# Patient Record
Sex: MALE | Race: WHITE | Employment: STUDENT | URBAN - METROPOLITAN AREA
[De-identification: names, ages, dates, MRNs, and addresses within clinical notes are randomized per-mention and may not be internally consistent; named-entity substitution may affect disease eponyms.]

---
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
## 2024-01-22 ENCOUNTER — HOSPITAL ENCOUNTER (INPATIENT)
Age: 19
LOS: 4 days | Discharge: HOME | End: 2024-01-26
Attending: EMERGENCY MEDICINE | Admitting: PSYCHIATRY & NEUROLOGY
Payer: COMMERCIAL

## 2024-01-22 DIAGNOSIS — R45.851 SUICIDAL IDEATION: Primary | ICD-10-CM

## 2024-01-22 PROBLEM — F32.A DEPRESSION WITH SUICIDAL IDEATION: Status: ACTIVE | Noted: 2024-01-22

## 2024-01-22 PROBLEM — F33.2 MAJOR DEPRESSIVE DISORDER, RECURRENT SEVERE WITHOUT PSYCHOTIC FEATURES (HCC): Status: ACTIVE | Noted: 2024-01-22

## 2024-01-22 LAB
ALBUMIN SERPL-MCNC: 4.7 G/DL (ref 3.5–5.2)
ALP SERPL-CCNC: 92 U/L (ref 40–129)
ALT SERPL-CCNC: 20 U/L (ref 5–41)
AMPHET UR QL SCN: NEGATIVE
ANION GAP SERPL CALCULATED.3IONS-SCNC: 9 MMOL/L (ref 9–17)
AST SERPL-CCNC: 24 U/L
BARBITURATES UR QL SCN: NEGATIVE
BASOPHILS # BLD: 0 K/UL (ref 0–0.2)
BASOPHILS NFR BLD: 0 % (ref 0–2)
BENZODIAZ UR QL: NEGATIVE
BILIRUB DIRECT SERPL-MCNC: <0.1 MG/DL
BILIRUB INDIRECT SERPL-MCNC: NORMAL MG/DL (ref 0–1)
BILIRUB SERPL-MCNC: 0.3 MG/DL (ref 0.3–1.2)
BILIRUB UR QL STRIP: NEGATIVE
BUN SERPL-MCNC: 11 MG/DL (ref 6–20)
CALCIUM SERPL-MCNC: 9.8 MG/DL (ref 8.6–10.4)
CANNABINOIDS UR QL SCN: POSITIVE
CHLORIDE SERPL-SCNC: 105 MMOL/L (ref 98–107)
CLARITY UR: CLEAR
CO2 SERPL-SCNC: 25 MMOL/L (ref 20–31)
COCAINE UR QL SCN: NEGATIVE
COLOR UR: YELLOW
COMMENT: NORMAL
CREAT SERPL-MCNC: 1 MG/DL (ref 0.7–1.2)
EOSINOPHIL # BLD: 0 K/UL (ref 0–0.4)
EOSINOPHILS RELATIVE PERCENT: 0 % (ref 0–4)
ERYTHROCYTE [DISTWIDTH] IN BLOOD BY AUTOMATED COUNT: 13.3 % (ref 11.5–14.9)
ETHANOL PERCENT: <0.01 %
ETHANOLAMINE SERPL-MCNC: <10 MG/DL
FENTANYL UR QL: NEGATIVE
GFR SERPL CREATININE-BSD FRML MDRD: >60 ML/MIN/1.73M2
GLUCOSE SERPL-MCNC: 122 MG/DL (ref 70–99)
GLUCOSE UR STRIP-MCNC: NEGATIVE MG/DL
HCT VFR BLD AUTO: 46.9 % (ref 41–53)
HGB BLD-MCNC: 15.7 G/DL (ref 13.5–17.5)
HGB UR QL STRIP.AUTO: NEGATIVE
KETONES UR STRIP-MCNC: NEGATIVE MG/DL
LEUKOCYTE ESTERASE UR QL STRIP: NEGATIVE
LYMPHOCYTES NFR BLD: 1.2 K/UL (ref 1.2–5.2)
LYMPHOCYTES RELATIVE PERCENT: 11 % (ref 25–45)
MCH RBC QN AUTO: 30 PG (ref 26–34)
MCHC RBC AUTO-ENTMCNC: 33.4 G/DL (ref 31–37)
MCV RBC AUTO: 89.7 FL (ref 80–100)
METHADONE UR QL: NEGATIVE
MONOCYTES NFR BLD: 0.4 K/UL (ref 0.1–1.3)
MONOCYTES NFR BLD: 4 % (ref 2–8)
NEUTROPHILS NFR BLD: 85 % (ref 34–64)
NEUTS SEG NFR BLD: 9.2 K/UL (ref 1.3–9.1)
NITRITE UR QL STRIP: NEGATIVE
OPIATES UR QL SCN: NEGATIVE
OXYCODONE UR QL SCN: NEGATIVE
PCP UR QL SCN: NEGATIVE
PH UR STRIP: 6.5 [PH] (ref 5–8)
PLATELET # BLD AUTO: 258 K/UL (ref 150–450)
PMV BLD AUTO: 8.4 FL (ref 6–12)
POTASSIUM SERPL-SCNC: 4.3 MMOL/L (ref 3.7–5.3)
PROT SERPL-MCNC: 7.4 G/DL (ref 6.4–8.3)
PROT UR STRIP-MCNC: NEGATIVE MG/DL
RBC # BLD AUTO: 5.23 M/UL (ref 4.5–5.9)
SODIUM SERPL-SCNC: 139 MMOL/L (ref 135–144)
SP GR UR STRIP: 1.01 (ref 1–1.03)
TEST INFORMATION: ABNORMAL
TSH SERPL DL<=0.05 MIU/L-ACNC: 0.51 UIU/ML (ref 0.3–5)
UROBILINOGEN UR STRIP-ACNC: NORMAL EU/DL (ref 0–1)
WBC OTHER # BLD: 10.9 K/UL (ref 4.5–13.5)

## 2024-01-22 PROCEDURE — 36415 COLL VENOUS BLD VENIPUNCTURE: CPT

## 2024-01-22 PROCEDURE — 99223 1ST HOSP IP/OBS HIGH 75: CPT | Performed by: PSYCHIATRY & NEUROLOGY

## 2024-01-22 PROCEDURE — APPSS60 APP SPLIT SHARED TIME 46-60 MINUTES: Performed by: NURSE PRACTITIONER

## 2024-01-22 PROCEDURE — 80076 HEPATIC FUNCTION PANEL: CPT

## 2024-01-22 PROCEDURE — 85025 COMPLETE CBC W/AUTO DIFF WBC: CPT

## 2024-01-22 PROCEDURE — 80048 BASIC METABOLIC PNL TOTAL CA: CPT

## 2024-01-22 PROCEDURE — 80307 DRUG TEST PRSMV CHEM ANLYZR: CPT

## 2024-01-22 PROCEDURE — 81003 URINALYSIS AUTO W/O SCOPE: CPT

## 2024-01-22 PROCEDURE — 1240000000 HC EMOTIONAL WELLNESS R&B

## 2024-01-22 PROCEDURE — 99222 1ST HOSP IP/OBS MODERATE 55: CPT | Performed by: INTERNAL MEDICINE

## 2024-01-22 PROCEDURE — G0480 DRUG TEST DEF 1-7 CLASSES: HCPCS

## 2024-01-22 PROCEDURE — 84443 ASSAY THYROID STIM HORMONE: CPT

## 2024-01-22 PROCEDURE — 99285 EMERGENCY DEPT VISIT HI MDM: CPT

## 2024-01-22 RX ORDER — ACETAMINOPHEN 325 MG/1
650 TABLET ORAL EVERY 4 HOURS PRN
Status: DISCONTINUED | OUTPATIENT
Start: 2024-01-22 | End: 2024-01-26 | Stop reason: HOSPADM

## 2024-01-22 RX ORDER — CITALOPRAM HYDROBROMIDE 10 MG/1
10 TABLET ORAL DAILY
Status: DISCONTINUED | OUTPATIENT
Start: 2024-01-22 | End: 2024-01-23

## 2024-01-22 RX ORDER — IBUPROFEN 400 MG/1
400 TABLET ORAL EVERY 6 HOURS PRN
Status: DISCONTINUED | OUTPATIENT
Start: 2024-01-22 | End: 2024-01-26 | Stop reason: HOSPADM

## 2024-01-22 RX ORDER — MAGNESIUM HYDROXIDE/ALUMINUM HYDROXICE/SIMETHICONE 120; 1200; 1200 MG/30ML; MG/30ML; MG/30ML
30 SUSPENSION ORAL EVERY 6 HOURS PRN
Status: DISCONTINUED | OUTPATIENT
Start: 2024-01-22 | End: 2024-01-26 | Stop reason: HOSPADM

## 2024-01-22 RX ORDER — POLYETHYLENE GLYCOL 3350 17 G/17G
17 POWDER, FOR SOLUTION ORAL DAILY PRN
Status: DISCONTINUED | OUTPATIENT
Start: 2024-01-22 | End: 2024-01-26 | Stop reason: HOSPADM

## 2024-01-22 RX ORDER — HYDROXYZINE 50 MG/1
50 TABLET, FILM COATED ORAL 3 TIMES DAILY PRN
Status: DISCONTINUED | OUTPATIENT
Start: 2024-01-22 | End: 2024-01-26 | Stop reason: HOSPADM

## 2024-01-22 RX ORDER — TRAZODONE HYDROCHLORIDE 50 MG/1
50 TABLET ORAL NIGHTLY PRN
Status: DISCONTINUED | OUTPATIENT
Start: 2024-01-22 | End: 2024-01-26 | Stop reason: HOSPADM

## 2024-01-22 ASSESSMENT — PATIENT HEALTH QUESTIONNAIRE - PHQ9
1. LITTLE INTEREST OR PLEASURE IN DOING THINGS: 1
SUM OF ALL RESPONSES TO PHQ QUESTIONS 1-9: 1
SUM OF ALL RESPONSES TO PHQ9 QUESTIONS 1 & 2: 1
2. FEELING DOWN, DEPRESSED OR HOPELESS: 0
SUM OF ALL RESPONSES TO PHQ QUESTIONS 1-9: 1

## 2024-01-22 ASSESSMENT — SLEEP AND FATIGUE QUESTIONNAIRES
AVERAGE NUMBER OF SLEEP HOURS: 7
DO YOU HAVE DIFFICULTY SLEEPING: NO
DO YOU USE A SLEEP AID: NO

## 2024-01-22 ASSESSMENT — LIFESTYLE VARIABLES
HOW OFTEN DO YOU HAVE A DRINK CONTAINING ALCOHOL: MONTHLY OR LESS
HOW MANY STANDARD DRINKS CONTAINING ALCOHOL DO YOU HAVE ON A TYPICAL DAY: PATIENT DOES NOT DRINK
HOW MANY STANDARD DRINKS CONTAINING ALCOHOL DO YOU HAVE ON A TYPICAL DAY: 1 OR 2
HOW OFTEN DO YOU HAVE A DRINK CONTAINING ALCOHOL: NEVER
HOW OFTEN DO YOU HAVE A DRINK CONTAINING ALCOHOL: NEVER
HOW MANY STANDARD DRINKS CONTAINING ALCOHOL DO YOU HAVE ON A TYPICAL DAY: PATIENT DOES NOT DRINK

## 2024-01-22 ASSESSMENT — ENCOUNTER SYMPTOMS
VOMITING: 0
SHORTNESS OF BREATH: 0
ABDOMINAL PAIN: 0
NAUSEA: 0
COUGH: 0

## 2024-01-22 ASSESSMENT — PAIN - FUNCTIONAL ASSESSMENT: PAIN_FUNCTIONAL_ASSESSMENT: NONE - DENIES PAIN

## 2024-01-22 NOTE — PROGRESS NOTES
Pharmacy Medication History Note      List of current medications patient is taking is complete.    Source of information: OARRS, Care Everywhere, [facility] [address] ([place])    Changes made to medication list:  Medications removed (include reason, ex. therapy complete or physician discontinued, noncompliance):  none    Medications flagged for provider review:  none    Medications added/doses adjusted:  none    Other notes (ex. Recent course of antibiotics, Coumadin dosing):  OARRS negative  Per Shraddha, patient does not fill any medications at [facility]       Current Home Medication List at Time of Admission:  Prior to Admission medications    Medication Sig   acetaminophen (TYLENOL) 500 MG tablet Take 2 tablets by mouth every 6 hours as needed for Pain   ibuprofen (ADVIL) 200 MG tablet Take 2 tablets by mouth every 6 hours as needed for Pain         Please let me know if you have any questions about this encounter. Thank you!    Electronically signed by Bonnie Arteaga RP on 1/22/2024 at 3:37 PM

## 2024-01-22 NOTE — BH NOTE
[facility]  Admission Note     Admission Type:   Admission Type: Voluntary    Reason for admission:  Reason for Admission: Patient reports increase in depressive thoughts and thoughts of hurting self      Addictive Behavior:   Addictive Behavior  In the Past 3 Months, Have You Felt or Has Someone Told You That You Have a Problem With  : None    Medical Problems:   Past Medical History:   Diagnosis Date    ADHD (attention deficit hyperactivity disorder)     Dx at [facility]     COVID-19 vaccination not done     Depression     Lymphatic malformation     Rt chest wall    Wellness examination     Anna Resendiz CNP; last visit 1/13/23       Status EXAM:  Mental Status and Behavioral Exam  Normal: No  Level of Assistance: Independent/Self  Facial Expression: Avoids Gaze  Affect: Incongruent  Level of Consciousness: Alert  Frequency of Checks: 4 times per hour, close  Mood:Normal: No  Mood: Depressed, Anxious, Suspicious  Motor Activity:Normal: Yes  Eye Contact: Fair  Observed Behavior: Withdrawn, Guarded, Preoccupied  Sexual Misconduct History: Current - no  Preception: [place] to person, [place] to time, [place] to place, [place] to situation  Attention:Normal: Yes  Thought Processes: Circumstantial  Thought Content:Normal: No  Thought Content: Paranoia, Preoccupations  Depression Symptoms: Change in energy level, Feelings of hopelessess, Loss of interest  Anxiety Symptoms: Generalized  Inga Symptoms: No problems reported or observed.  Hallucinations: None  Delusions: No  Memory:Normal: No  Memory: Poor recent, Poor remote  Insight and Judgment: No  Insight and Judgment: Poor judgment, Poor insight, Unmotivated    Tobacco Screening:  Practical Counseling, on admission, cyndi X, if applicable and completed (first 3 are required if patient doesn't refuse):            ( ) Recognizing danger situations (included triggers and roadblocks)                    ( ) Coping skills (new ways to manage stress,relaxation
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
## 2024-01-22 NOTE — BH NOTE
Patient given tobacco quitline number 72261203808 at this time, refusing to call at this time, states \" I just dont want to quit now\"- patient given information as to the dangers of long term tobacco use. Continue to reinforce the importance of tobacco cessation.

## 2024-01-22 NOTE — PROGRESS NOTES

## 2024-01-22 NOTE — GROUP NOTE
Group Therapy Note    Date: 1/22/2024    Group Start Time: 1330  Group End Time: 1445  Group Topic: recreation therapy group     [facility]        Group Therapy Note    Attendees: 14/19       Patient's Goal:  pt will demonstrate improved improved leisure awareness and improved interpersonal relationships     Notes:  pt was pleasant and participated well    Status After Intervention:  Improved    Participation Level: Active Listener and Interactive    Participation Quality: Appropriate and Supportive      Speech:  normal      Thought Process/Content: Logical      Affective Functioning:flat      Mood: depressed      Level of consciousness:  Alert      Response to Learning: Able to verbalize current knowledge/experience, Capable of insight, and Progressing to goal      Endings: None Reported    Modes of Intervention: Education, Support, and Activity      Discipline Responsible: Psychoeducational Specialist      Signature:  MIGUEL ÁNGEL BUSH

## 2024-01-22 NOTE — ED NOTES
Patient gave verbal consent to update mom. Writer called mom and gave admitting information to her.
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
## 2024-01-22 NOTE — H&P
IN-PATIENT SERVICE  [facility] Internal Medicine    HISTORY AND PHYSICAL EXAMINATION/ CONSULT NOTE            Date:   1/22/2024  Patient name:  Sheba Arciniega  Date of admission:  1/22/2024  8:43 AM  MRN:   [id number]  Account:  [id number]  YOB: 2005  PCP:    Anna Resendiz APRN - CNP  Room:   [address] [place] [address]  Code Status:    Full Code    Physician Requesting Consult: Tino Hamm MD    Reason for Consult: History and physical, medical management    Chief Complaint:     Chief Complaint   Patient presents with    Suicidal     Medical management    History Obtained From:     Patient, EMR, nursing staff    History of Present Illness:     18-year-old male admitted for suicidal ideation    No significant medical history  Patient denies any chest pain palpitation cough difficulty breathing nausea vomiting diarrhea or urinary symptoms      Past Medical History:     Past Medical History:   Diagnosis Date    ADHD (attention deficit hyperactivity disorder)     Dx at [facility]     COVID-19 vaccination not done     Depression     Lymphatic malformation     Rt chest wall    Wellness examination     Anna Resendiz CNP; last visit 1/13/23        Past Surgical History:     Past Surgical History:   Procedure Laterality Date    BREAST SURGERY Right 4/5/2023    EXCISION CHEST WALL MALFORMATION performed by Praveen Banerjee MD at [facility] OR    SKIN LESION EXCISION Right 04/05/2023    EXCISION CHEST WALL MALFORMATION (Right)        Medications Prior to Admission:     Prior to Admission medications    Medication Sig Start Date End Date Taking? Authorizing Provider   acetaminophen (TYLENOL) 500 MG tablet Take 2 tablets by mouth every 6 hours as needed for Pain 4/5/23   Lexa Eaton APRN - CNP   ibuprofen (ADVIL) 200 MG tablet Take 2 tablets by mouth every 6 hours as needed for Pain 4/5/23   Lexa Eaton APRN - CNP        Allergies:     Patient has no known allergies.    Social History:     Tobacco:     reports that he has never smoked. He has never used smokeless tobacco.  Alcohol:      reports no history of alcohol use.  Drug Use:  reports no history of drug use.    Family History:     Family History   Problem Relation Age of Onset    Depression Mother     Asthma Mother     Other Mother         PCOS    No Known Problems Father     Depression Maternal Grandfather     Diabetes Paternal Grandmother     Heart Disease Paternal Grandmother     High Blood Pressure Paternal Grandfather     Diabetes Paternal Grandfather        Review of Systems:     Positive and Negative as described in HPI.    Denies any shortness of breath or cough  Denies chest pain or palpitations  Denies abdominal pain, diarrhea vomiting  Denies any new numbness tremors or weakness.    10 point review of systems was negative other than mentioned above    Physical Exam:     /72   Pulse 72   Temp 98.3 °F (36.8 °C) (Oral)   Resp 15   Ht 1.829 m (6')   Wt 68 kg (150 lb)   SpO2 99%   BMI 20.34 kg/m²   Temp (24hrs), Av.2 °F (36.8 °C), Min:98 °F (36.7 °C), Max:98.3 °F (36.8 °C)    No results for input(s): \"POCGLU\" in the last 72 hours.  No intake or output data in the 24 hours ending 24 1612    General Appearance:  alert, well appearing, and in no acute distress  Head:  normocephalic, atraumatic.  Eye: no icterus, redness, pupils equal and reactive, extraocular eye movements intact, conjunctiva clear  Ear: normal external ear, no discharge, hearing intact  Nose:  no drainage noted  Mouth: mucous membranes moist  Neck: supple, no carotid bruits, thyroid not palpable  Lungs: Bilateral equal air entry, clear to ausculation, no wheezing, rales or rhonchi, normal effort  Cardiovascular: normal rate, regular rhythm, no murmur, gallop, rub.  Abdomen: Soft, nontender, nondistended, normal bowel sounds, no hepatomegaly or splenomegaly  Neurologic: There are no new focal motor or sensory deficits, normal muscle tone and bulk, no abnormal
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
## 2024-01-23 PROCEDURE — 1240000000 HC EMOTIONAL WELLNESS R&B

## 2024-01-23 PROCEDURE — 6370000000 HC RX 637 (ALT 250 FOR IP): Performed by: NURSE PRACTITIONER

## 2024-01-23 PROCEDURE — 99232 SBSQ HOSP IP/OBS MODERATE 35: CPT | Performed by: PSYCHIATRY & NEUROLOGY

## 2024-01-23 PROCEDURE — 6370000000 HC RX 637 (ALT 250 FOR IP): Performed by: PSYCHIATRY & NEUROLOGY

## 2024-01-23 PROCEDURE — APPSS30 APP SPLIT SHARED TIME 16-30 MINUTES: Performed by: NURSE PRACTITIONER

## 2024-01-23 RX ORDER — CITALOPRAM 20 MG/1
20 TABLET ORAL DAILY
Status: DISCONTINUED | OUTPATIENT
Start: 2024-01-24 | End: 2024-01-26 | Stop reason: HOSPADM

## 2024-01-23 RX ADMIN — CITALOPRAM HYDROBROMIDE 10 MG: 10 TABLET ORAL at 08:09

## 2024-01-23 RX ADMIN — ACETAMINOPHEN 650 MG: 325 TABLET ORAL at 08:09

## 2024-01-23 RX ADMIN — IBUPROFEN 400 MG: 400 TABLET, FILM COATED ORAL at 12:11

## 2024-01-23 ASSESSMENT — PAIN SCALES - GENERAL
PAINLEVEL_OUTOF10: 5
PAINLEVEL_OUTOF10: 6
PAINLEVEL_OUTOF10: 7

## 2024-01-23 ASSESSMENT — PAIN DESCRIPTION - DESCRIPTORS: DESCRIPTORS: ACHING

## 2024-01-23 ASSESSMENT — PAIN DESCRIPTION - LOCATION: LOCATION: HEAD

## 2024-01-23 NOTE — PLAN OF CARE
[facility]  Initial Interdisciplinary Treatment Plan Note      Original treatment plan Date & Time: 1/23/2024   1245    Admission Type:  Admission Type: Voluntary    Reason for admission:   Reason for Admission: Patient reports increase in depressive thoughts and thoughts of hurting self    Estimated Length of Stay:  5-7days  Estimated Discharge Date: To be determined by physician.    PATIENT STRENGTHS:  Patient Strengths:   Patient Strengths and Limitations:Limitations: Multiple barriers to leisure interests, Difficulty problem solving/relies on others to help solve problems, External locus of control, Inappropriate/potentially harmful leisure interests  Addictive Behavior: Addictive Behavior  In the Past 3 Months, Have You Felt or Has Someone Told You That You Have a Problem With  : None  Medical Problems:  Past Medical History:   Diagnosis Date    ADHD (attention deficit hyperactivity disorder)     Dx at [facility]     COVID-19 vaccination not done     Depression     Lymphatic malformation     Rt chest wall    Wellness examination     Anna Resendiz CNP; last visit 1/13/23     Status EXAM:Mental Status and Behavioral Exam  Normal: No  Level of Assistance: Independent/Self  Facial Expression: Avoids Gaze  Affect: Incongruent  Level of Consciousness: Alert  Frequency of Checks: 4 times per hour, close  Mood:Normal: No  Mood: Depressed, Anxious  Motor Activity:Normal: Yes  Eye Contact: Fair  Observed Behavior: Withdrawn, Guarded  Sexual Misconduct History: Current - no  Preception: [place] to person, [place] to time, [place] to place, [place] to situation  Attention:Normal: Yes  Attention: Distractible  Thought Processes: Circumstantial  Thought Content:Normal: No  Thought Content: Paranoia, Preoccupations  Depression Symptoms: Change in energy level, Loss of interest, Feelings of hopelessess  Anxiety Symptoms: Generalized  Inga Symptoms: No problems reported or observed.  Hallucinations: None  Delusions:

## 2024-01-23 NOTE — GROUP NOTE
Group Therapy Note    Date: 1/23/2024    Group Start Time: 1100  Group End Time: 1140  Group Topic: Cognitive Skills    [facility]        Group Therapy Note    Attendees: 13/20       Patient's Goal:  pt will demonstrate improved coping skills and improved decision making skills     Notes:  pt was pleasant and participated well    Status After Intervention:  Improved    Participation Level: Active Listener and Interactive    Participation Quality: Appropriate and Supportive      Speech:  normal      Thought Process/Content: Logical      Affective Functioning: Congruent      Mood: euthymic      Level of consciousness:  Alert      Response to Learning: Able to verbalize current knowledge/experience, Capable of insight, and Progressing to goal      Endings: None Reported    Modes of Intervention: Support and Activity      Discipline Responsible: Psychoeducational Specialist      Signature:  MIGUEL ÁNGEL BUSH
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
## 2024-01-23 NOTE — PLAN OF CARE
Problem: Self Harm/Suicidality  Goal: Will have no self-injury during hospital stay  Description: INTERVENTIONS:  1.  Ensure constant observer at bedside with Q15M safety checks  2.  Maintain a safe environment  3.  Secure patient belongings  4.  Ensure family/visitors adhere to safety recommendations  5.  Ensure safety tray has been added to patient's diet order  6.  Every shift and PRN: Re-assess suicidal risk via Frequent Screener    Outcome: Progressing  Note: No self harm behaviors noted. Patient denies suicidal and homicidal ideation and auditory and visual hallucinations and verbally agrees to approach staff if thoughts of self harm arises. Patient admits to depression and anxiety. Patient is eating and sleeping well. Patient is out and social. Patient attends unit programming. Reassurance and support provided. Q15 minute checks maintained. Patient remains safe at this time.

## 2024-01-23 NOTE — PROGRESS NOTES
Behavioral Services  Medicare Certification Upon Admission    I certify that this patient's inpatient psychiatric hospital admission is medically necessary for:    [x] (1) Treatment which could reasonably be expected to improve this patient's condition,       [x] (2) Or for diagnostic study;     AND     [x](2) The inpatient psychiatric services are provided while the individual is under the care of a physician and are included in the individualized plan of care.    Estimated length of stay/service 2-9 days    Plan for post-hospital care -outpatient care    Electronically signed by MADY BOWMAN MD on 1/22/2024 at 7:56 PM

## 2024-01-23 NOTE — PLAN OF CARE
Problem: Self Harm/Suicidality  Goal: Will have no self-injury during hospital stay  Description: INTERVENTIONS:  1.  Ensure constant observer at bedside with Q15M safety checks  2.  Maintain a safe environment  3.  Secure patient belongings  4.  Ensure family/visitors adhere to safety recommendations  5.  Ensure safety tray has been added to patient's diet order  6.  Every shift and PRN: Re-assess suicidal risk via Frequent Screener    1/23/2024 1643 by Vijaya France LPN  Outcome: Progressing  Flowsheets (Taken 1/23/2024 1643)  Will have no self-injury during hospital stay:   Ensure constant observer at bedside with Q15M safety checks   Ensure family/visitors adhere to safety recommendations   Every shift and PRN: Re-assess suicidal risk via Frequent Screener   Maintain a safe environment   Secure patient belongings   Ensure safety tray has been added to patient's diet order  Note: Patient denies thoughts of self harm or harm to others during this shift. Staff encouraged patient to notify staff if thoughts of self harm or harm to others occur. Staff ensures patient safety by intermediate and safety checks every 15 minutes.       Problem: Depression  Goal: Will be euthymic at discharge  Description: INTERVENTIONS:  1. Administer medication as ordered  2. Provide emotional support via 1:1 interaction with staff  3. Encourage involvement in milieu/groups/activities  4. Monitor for social isolation  1/23/2024 1643 by Vijaya France LPN  Outcome: Progressing  Note: Patient remains free from harm to self or others but admits to continued anxiety and depression. Medications available upon request. Staff ensures safety by providing safety checks on the unit intermittently and every 15 minutes. Staff continues to provide a safe environment. Staff encouraged patient to notify if depression continues.
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
## 2024-01-23 NOTE — GROUP NOTE
Group Therapy Note    Date: 1/23/2024    Group Start Time: 0230  Group End Time: 0330  Group Topic: Cognitive Skills    Vijaya Collier LPN        Group Therapy Note    Attendees: 13/20       Patient's Goal:  Make weekly goals    Status After Intervention:  Improved    Participation Level: Active Listener and Interactive    Participation Quality: Appropriate, Attentive, Sharing, and Supportive      Speech:  normal and hesitant      Thought Process/Content: Logical  Flight of ideas      Affective Functioning: Congruent      Mood: elevated      Level of consciousness:  Alert, Oriented x4, and Attentive      Response to Learning: Able to verbalize current knowledge/experience, Able to verbalize/acknowledge new learning, Able to retain information, Capable of insight, and Able to change behavior      Endings: None Reported    Modes of Intervention: Education, Support, Socialization, Clarifying, Problem-solving, Activity, and Limit-setting      Discipline Responsible: Licensed Practical Nurse      Signature:  Vijaya France LPN

## 2024-01-23 NOTE — GROUP NOTE
Group Therapy Note    Date: 1/23/2024    Group Start Time: 0900  Group End Time: 0915  Group Topic: Community Meeting    Sindhu Monroe        Group Therapy Note    Attendees: 12/20       Patient's Goal:  Get more level     Status After Intervention:  Unchanged    Participation Level: Active Listener and Interactive    Participation Quality: Appropriate and Attentive      Speech:  normal      Thought Process/Content: Linear      Affective Functioning: Congruent      Mood: euthymic      Level of consciousness:  Alert and Oriented x4      Response to Learning: Able to verbalize current knowledge/experience and Able to verbalize/acknowledge new learning      Endings: None Reported    Modes of Intervention: Education and Support      Discipline Responsible: Behavorial Health Tech      Signature:  Sindhu Boyle

## 2024-01-23 NOTE — GROUP NOTE
Group Therapy Note    Date: 1/23/2024    Group Start Time: 1000  Group End Time: 1045  Group Topic: Group Therapy    Janine Sofia LPN        Group Therapy Note    Attendees: 13/20    Patient has attended discharge planning and safety planning group.        Signature:  Janine Dang LPN

## 2024-01-23 NOTE — BH NOTE
Provider notified of best practice advisor for suicide precautions and did not want to continue suicide precautions. Elana Herbert RN

## 2024-01-24 PROCEDURE — 1240000000 HC EMOTIONAL WELLNESS R&B

## 2024-01-24 PROCEDURE — 6370000000 HC RX 637 (ALT 250 FOR IP): Performed by: PSYCHIATRY & NEUROLOGY

## 2024-01-24 PROCEDURE — 6370000000 HC RX 637 (ALT 250 FOR IP): Performed by: NURSE PRACTITIONER

## 2024-01-24 PROCEDURE — 99232 SBSQ HOSP IP/OBS MODERATE 35: CPT | Performed by: PSYCHIATRY & NEUROLOGY

## 2024-01-24 PROCEDURE — APPSS30 APP SPLIT SHARED TIME 16-30 MINUTES

## 2024-01-24 RX ADMIN — HYDROXYZINE HYDROCHLORIDE 50 MG: 50 TABLET, FILM COATED ORAL at 12:58

## 2024-01-24 RX ADMIN — CITALOPRAM HYDROBROMIDE 20 MG: 20 TABLET ORAL at 08:18

## 2024-01-24 NOTE — PLAN OF CARE
Problem: Self Harm/Suicidality  Goal: Will have no self-injury during hospital stay  Description: INTERVENTIONS:  1.  Ensure constant observer at bedside with Q15M safety checks  2.  Maintain a safe environment  3.  Secure patient belongings  4.  Ensure family/visitors adhere to safety recommendations  5.  Ensure safety tray has been added to patient's diet order  6.  Every shift and PRN: Re-assess suicidal risk via Frequent Screener    1/24/2024 0016 by Akanksha Valdez LPN  Outcome: Progressing  Note: Patient denies wanting to hurt self of others at this time.  Patient educated to seek help from staff if needing anything.

## 2024-01-24 NOTE — GROUP NOTE
Group Therapy Note    Date: 1/24/2024    Group Start Time: 1045  Group End Time: 1130  Group Topic: Cognitive Skills    Meme An CTRS    Cognitive Skills Group Note        Date: January 24, 2024 Start Time:  1045am   End Time: 11:30am      Number of Participants in Group & Unit Census:  11/20    Topic: cognitive skills     Goal of Group: pt will demonstrate improved coping skills and improved interpersonal relationships       Comments:     Patient did not participate in Cognitive Skills group, despite staff encouragement and explanation of benefits.  Patient remain seclusive to self.  Q15 minute safety checks maintained for patient safety and will continue to encourage patient to attend unit programming.              Signature:  MIGUEL ÁNGEL BUSH

## 2024-01-24 NOTE — GROUP NOTE
Group Therapy Note    Date: 1/24/2024    Group Start Time: 0915  Group End Time: 0945  Group Topic: Community Meeting    Lin Harmon        Group Therapy Note    Attendees: 7/20       Patient's Goal:  Not feel yucky, go to groups, increase fluid    Notes:      Status After Intervention:  Unchanged    Participation Level: Active Listener    Participation Quality: Appropriate      Speech:  normal      Thought Process/Content: Logical      Affective Functioning: Congruent      Mood: anxious      Level of consciousness:  Oriented x4      Response to Learning: Able to verbalize current knowledge/experience and Progressing to goal      Endings: None Reported    Modes of Intervention: Education, Support, and Socialization      Discipline Responsible: Behavorial Health Tech      Signature:  Lin Tapia

## 2024-01-24 NOTE — PLAN OF CARE
Problem: Self Harm/Suicidality  Goal: Will have no self-injury during hospital stay  Description: INTERVENTIONS:  1.  Ensure constant observer at bedside with Q15M safety checks  2.  Maintain a safe environment  3.  Secure patient belongings  4.  Ensure family/visitors adhere to safety recommendations  5.  Ensure safety tray has been added to patient's diet order  6.  Every shift and PRN: Re-assess suicidal risk via Frequent Screener    Outcome: Progressing  Flowsheets (Taken 1/24/2024 1600)  Will have no self-injury during hospital stay:   Maintain a safe environment   Secure patient belongings   Every shift and PRN: Re-assess suicidal risk via Frequent Screener   Ensure safety tray has been added to patient's diet order  Note: No self harm noted this shift. Patient denied thoughts of suicide or self-harm and agreed to seek out staff should negative thoughts arise. Patient denies hallucination and endorses both anxiety and depression @ 3-4 on a 1-10 scale. Patient is social in the dayroom with select peers, compliant with meds and behavior is controlled. Safe environment maintained. Q15 minute checks for safety continued per unit policy. Will continue to monitor for safety and provide support and reassurance as needed.       Problem: Depression  Goal: Will be euthymic at discharge  Description: INTERVENTIONS:  1. Administer medication as ordered  2. Provide emotional support via 1:1 interaction with staff  3. Encourage involvement in milieu/groups/activities  4. Monitor for social isolation  Outcome: Progressing     Problem: Behavior  Goal: Pt/Family maintain appropriate behavior and adhere to behavioral management agreement, if implemented  Description: INTERVENTIONS:  1. Assess patient/family's coping skills and  non-compliant behavior (including use of illegal substances)  2. Notify security of behavior or suspected illegal substances which indicate the need for search of the family and/or belongings  3.  Encourage verbalization of thoughts and concerns in a socially appropriate manner  4. Utilize positive, consistent limit setting strategies supporting safety of patient, staff and others  5. Encourage participation in the decision making process about the behavioral management agreement  6. If a visitor's behavior poses a threat to safety call refer to organization policy.  7. Initiate consult with , Psychosocial CNS, Spiritual Care as appropriate  Outcome: Progressing  Flowsheets (Taken 1/24/2024 1600)  Patient/family maintains appropriate behavior and adheres to behavioral management agreement, if implemented: Assess patient/family’s coping skills and  non-compliant behavior (including use of illegal substances)

## 2024-01-24 NOTE — PROGRESS NOTES
vital signs with nursing staff.    Labs reviewed: [x] Yes      Medications  Current Facility-Administered Medications: citalopram (CELEXA) tablet 20 mg, 20 mg, Oral, Daily  acetaminophen (TYLENOL) tablet 650 mg, 650 mg, Oral, Q4H PRN  ibuprofen (ADVIL;MOTRIN) tablet 400 mg, 400 mg, Oral, Q6H PRN  polyethylene glycol (GLYCOLAX) packet 17 g, 17 g, Oral, Daily PRN  aluminum & magnesium hydroxide-simethicone (MAALOX) 200-200-20 MG/5ML suspension 30 mL, 30 mL, Oral, Q6H PRN  hydrOXYzine HCl (ATARAX) tablet 50 mg, 50 mg, Oral, TID PRN  traZODone (DESYREL) tablet 50 mg, 50 mg, Oral, Nightly PRN    ASSESSMENT  Major depressive disorder, recurrent severe without psychotic features (HCC)         HANDOFF  Patient symptoms are: Modestly Improving.  Medications as determined by attending physician  Monitor need and frequency of PRN medications.  Encourage participation in groups and milieu.  Probable discharge is to be determined by MD.     Electronically signed by ELLIS Will CNP on 1/24/2024 at 2:02 PM    **This report has been created using voice recognition software. It may contain minor errors which are inherent in voice recognition technology.**  I independently saw and evaluated the patient.  I reviewed the  documentation of the NP.  Any additional comments or changes to the    documentation are stated below otherwise agree with assessment.          No Medication Changes Today  PLAN  Medications as noted above  Attempt to develop insight  Expected Length of Stay is 1-3 days.   Psycho-education conducted.  Supportive Therapy conducted.  Follow-up daily while on inpatient unit    Electronically signed by MADY BOWMAN MD on 1/24/24 at 10:01 PM EST

## 2024-01-24 NOTE — GROUP NOTE
Group Therapy Note    Date: 1/24/2024    Group Start Time: 1330  Group End Time: 1415  Group Topic: Psychoeducation    [facility] Appiah [facility]    Psych-Ed/Relapse Prevention Group Note        Date: January 24, 2024 Start Time: 1:30pm  End Time:  215pm      Number of Participants in Group & Unit Census:  9/19    Topic: psycheducation group     Goal of Group: pt will demonstrate improved communication skills and improved interpersonal relationships      Comments:     Patient did not participate in Psych-Ed/Relapse Prevention group, despite staff encouragement and explanation of benefits.  Patient remain seclusive to self.  Q15 minute safety checks maintained for patient safety and will continue to encourage patient to attend unit programming.              Signature:  MIGUEL ÁNGEL BUSH

## 2024-01-25 PROCEDURE — 99232 SBSQ HOSP IP/OBS MODERATE 35: CPT | Performed by: PSYCHIATRY & NEUROLOGY

## 2024-01-25 PROCEDURE — 1240000000 HC EMOTIONAL WELLNESS R&B

## 2024-01-25 PROCEDURE — 6370000000 HC RX 637 (ALT 250 FOR IP): Performed by: PSYCHIATRY & NEUROLOGY

## 2024-01-25 PROCEDURE — APPSS30 APP SPLIT SHARED TIME 16-30 MINUTES

## 2024-01-25 RX ADMIN — CITALOPRAM HYDROBROMIDE 20 MG: 20 TABLET ORAL at 08:17

## 2024-01-25 NOTE — PROGRESS NOTES
be responding to internal stimuli. Denies auditory hallucinations. Denies visual hallucinations.   Cognition: Oriented to self, location, time, and situation.  Memory: Intact.  Insight & Judgement: Poor.     Data   height is 1.829 m (6') and weight is 68 kg (150 lb). His temporal temperature is 96.9 °F (36.1 °C). His blood pressure is 118/69 and his pulse is 74. His respiration is 13 and oxygen saturation is 99%.   Labs:   Admission on 01/22/2024   Component Date Value Ref Range Status    Sodium 01/22/2024 139  135 - 144 mmol/L Final    Potassium 01/22/2024 4.3  3.7 - 5.3 mmol/L Final    Chloride 01/22/2024 105  98 - 107 mmol/L Final    CO2 01/22/2024 25  20 - 31 mmol/L Final    Anion Gap 01/22/2024 9  9 - 17 mmol/L Final    Glucose 01/22/2024 122 (H)  70 - 99 mg/dL Final    BUN 01/22/2024 11  6 - 20 mg/dL Final    Creatinine 01/22/2024 1.0  0.7 - 1.2 mg/dL Final    Est, Glom Filt Rate 01/22/2024 >60  >60 mL/min/1.73m2 Final    Comment:       These results are not intended for use in patients <18 years of age.        eGFR results are calculated without a race factor using the 2021 CKD-EPI equation.  Careful clinical correlation is recommended, particularly when comparing to results   calculated using previous equations.  The CKD-EPI equation is less accurate in patients with extremes of muscle mass, extra-renal   metabolism of creatine, excessive creatine ingestion, or following therapy that affects   renal tubular secretion.      Calcium 01/22/2024 9.8  8.6 - 10.4 mg/dL Final    WBC 01/22/2024 10.9  4.5 - 13.5 k/uL Final    RBC 01/22/2024 5.23  4.5 - 5.9 m/uL Final    Hemoglobin 01/22/2024 15.7  13.5 - 17.5 g/dL Final    Hematocrit 01/22/2024 46.9  41 - 53 % Final    MCV 01/22/2024 89.7  80 - 100 fL Final    MCH 01/22/2024 30.0  26 - 34 pg Final    MCHC 01/22/2024 33.4  31 - 37 g/dL Final    RDW 01/22/2024 13.3  11.5 - 14.9 % Final    Platelets 01/22/2024 258  150 - 450 k/uL Final    MPV 01/22/2024 8.4  6.0 - 12.0  17 g, Oral, Daily PRN  aluminum & magnesium hydroxide-simethicone (MAALOX) 200-200-20 MG/5ML suspension 30 mL, 30 mL, Oral, Q6H PRN  hydrOXYzine HCl (ATARAX) tablet 50 mg, 50 mg, Oral, TID PRN  traZODone (DESYREL) tablet 50 mg, 50 mg, Oral, Nightly PRN    ASSESSMENT  Major depressive disorder, recurrent severe without psychotic features (HCC)         HANDOFF  Patient symptoms are: Modestly Improving.  Medications as determined by attending physician  Monitor need and frequency of PRN medications.  Encourage participation in groups and milieu.  Probable discharge is to be determined by MD.     Electronically signed by ELLIS Will CNP on 1/25/2024 at 1:58 PM    **This report has been created using voice recognition software. It may contain minor errors which are inherent in voice recognition technology.**  I independently saw and evaluated the patient.  I reviewed the  documentation above    .  Any additional comments or changes to the   documentation are stated below otherwise agree with assessment.      The chills mood is improved.  He denies any suicidal ideation and he has been participating in groups and interacting with his peers appropriately.  He has no side effects from his medications    No Medication Changes Today    PLAN  Medications as noted above  Attempt to develop insight  Psycho-education conducted.  Supportive Therapy conducted.  Follow-up daily while on inpatient unit    Electronically signed by MADY BOWMAN MD on 1/25/24 at 10:19 PM EST

## 2024-01-25 NOTE — GROUP NOTE
Group Therapy Note    Date: 1/25/2024    Group Start Time: 1330  Group End Time: 1415  Group Topic: relaxation group     [facility] App[facility]        Group Therapy Note    Attendees: 6/17       Patient's Goal:  pt will identify benefits of using art for leisure and coping      Notes:   pt was pleasant and participated well    Status After Intervention:  Improved    Participation Level: Active Listener and Interactive    Participation Quality: Appropriate, Sharing, and Supportive      Speech:  normal      Thought Process/Content:logical      Affective Functioning: Congruent      Mood: euthymic      Level of consciousness:  Alert      Response to Learning: Able to verbalize current knowledge/experience and Progressing to goal      Endings: None Reported    Modes of Intervention: Support and Activity      Discipline Responsible: Psychoeducational Specialist      Signature:  MIGUEL ÁNGEL BUSH

## 2024-01-25 NOTE — PLAN OF CARE
[facility]  Day 3 Interdisciplinary Treatment Plan NOTE    Review Date & Time: 1/25/2024   12:46 pm    Admission Type:   Admission Type: Voluntary    Reason for admission:  Reason for Admission: Patient reports increase in depressive thoughts and thoughts of hurting self  Estimated Length of Stay: 5-7 days  Estimated Discharge Date Update: to be determined by physician    PATIENT STRENGTHS:  Patient Strengths    Patient Strengths and Limitations:Limitations: Multiple barriers to leisure interests, Difficulty problem solving/relies on others to help solve problems, External locus of control, Inappropriate/potentially harmful leisure interests  Addictive Behavior:Addictive Behavior  In the Past 3 Months, Have You Felt or Has Someone Told You That You Have a Problem With  : None  Medical Problems:  Past Medical History:   Diagnosis Date    ADHD (attention deficit hyperactivity disorder)     Dx at [facility]     COVID-19 vaccination not done     Depression     Lymphatic malformation     Rt chest wall    Wellness examination     Anna Resendiz CNP; last visit 1/13/23       Risk:  Fall Risk   Marcos Scale Marcos Scale Score: 22  BVC    Change in scores no Changes to plan of Care no    Status EXAM:   Mental Status and Behavioral Exam  Normal: No  Level of Assistance: Independent/Self  Facial Expression: Avoids Gaze  Affect: Appropriate  Level of Consciousness: Alert  Frequency of Checks: 4 times per hour, close  Mood:Normal: No  Mood: Depressed, Anxious  Motor Activity:Normal: Yes  Eye Contact: Fair  Observed Behavior: Cooperative, Guarded  Sexual Misconduct History: Current - no  Preception: [place] to person, [place] to time, [place] to place, [place] to situation  Attention:Normal: No  Attention: Distractible  Thought Processes: Circumstantial  Thought Content:Normal: No  Thought Content: Preoccupations  Depression Symptoms: Feelings of hopelessess  Anxiety Symptoms: Generalized  Inga Symptoms: No problems  planning for after discharge goals, continue with medication compliance    SHORT-TERM GOALS UPDATE:   Time frame for Short-Term Goals: 5-7 days    LONG-TERM GOALS UPDATE:   Time frame for Long-Term Goals: 6 months  Members Present in Team Meeting: See Signature Sheet    Kellie Ray RN

## 2024-01-25 NOTE — GROUP NOTE
Group Therapy Note    Date: 1/25/2024    Group Start Time: 1430  Group End Time: 1500  Group Topic: Healthy Living/Wellness    Lin Harmon        Group Therapy Note    Attendees: 6/17     Health/Wellness Group Note        Date: January 25, 2024 Start Time: 2:30pm  End Time: 3pm      Number of Participants in Group & Unit Census:  6/17    Topic: Health/ Wellness    Goal of Group:  +  Coping skills A-Z      Comments:     Patient did not participate in Health/Wellness group, despite staff encouragement and explanation of benefits.  Patient remain seclusive to self.  Q15 minute safety checks maintained for patient safety and will continue to encourage patient to attend unit programming.

## 2024-01-25 NOTE — PLAN OF CARE
Problem: Self Harm/Suicidality  Goal: Will have no self-injury during hospital stay  Description: INTERVENTIONS:  1.  Ensure constant observer at bedside with Q15M safety checks  2.  Maintain a safe environment  3.  Secure patient belongings  4.  Ensure family/visitors adhere to safety recommendations  5.  Ensure safety tray has been added to patient's diet order  6.  Every shift and PRN: Re-assess suicidal risk via Frequent Screener    1/25/2024 1517 by Gogo Mead LPN  Outcome: Progressing  Flowsheets (Taken 1/25/2024 1517)  Will have no self-injury during hospital stay:   Maintain a safe environment   Secure patient belongings   Every shift and PRN: Re-assess suicidal risk via Frequent Screener  Note:  No self harm noted this shift. Patient denied thoughts of suicide or self-harm and agreed to seek out staff should negative thoughts arise. Patient denies hallucination and continues to endorse mild depression. Patient states he slept well and woke up feeling good. Patient is social in the dayroom with select peers, compliant with meds and behavior is controlled. Safe environment maintained. Q15 minute checks for safety continued per unit policy. Will continue to monitor for safety and provide support and reassurance as needed.   1/25/2024 1249 by Kellie Ray, RN  Outcome: Progressing

## 2024-01-25 NOTE — PLAN OF CARE
Problem: Self Harm/Suicidality  Goal: Will have no self-injury during hospital stay  Description: INTERVENTIONS:  1.  Ensure constant observer at bedside with Q15M safety checks  2.  Maintain a safe environment  3.  Secure patient belongings  4.  Ensure family/visitors adhere to safety recommendations  5.  Ensure safety tray has been added to patient's diet order  6.  Every shift and PRN: Re-assess suicidal risk via Frequent Screener    1/24/2024 2151 by Cherelle Stewart RN  Outcome: Progressing     Problem: Depression  Goal: Will be euthymic at discharge  Description: INTERVENTIONS:  1. Administer medication as ordered  2. Provide emotional support via 1:1 interaction with staff  3. Encourage involvement in milieu/groups/activities  4. Monitor for social isolation  1/24/2024 2151 by Cherelle Stewart RN  Outcome: Progressing  Note: Patient is out in day room social with peers watching television and playing games. He is friendly and cooperative with staff. He denies suicidal ideation and thoughts of self harm. Patient does not have any scheduled medications this shift. He denies need for any as needed medications. Staff maintains Q15 minute safety checks.

## 2024-01-25 NOTE — GROUP NOTE
Group Therapy Note    Date: 1/25/2024    Group Start Time: 1050  Group End Time: 1135  Group Topic: Cognitive Skills    [facility] Meme Appiah CTRS        Group Therapy Note    Attendees: 10/17       Patient's Goal:  pt will demonstrate improved coping skills and improved interpersonal relationships    Notes:   pt was pleasant and participated well    Status After Intervention:  Improved    Participation Level: Active Listener and Interactive    Participation Quality: Appropriate, Sharing, and Supportive      Speech:  normal      Thought Process/Content:logical      Affective Functioning: Congruent      Mood: euthymic      Level of consciousness:  Alert      Response to Learning: Able to verbalize current knowledge/experience and Progressing to goal      Endings: None Reported    Modes of Intervention: Support and Activity      Discipline Responsible: Psychoeducational Specialist      Signature:  MIGUEL ÁNGEL BUSH

## 2024-01-25 NOTE — GROUP NOTE
Group Therapy Note    Date: 1/25/2024    Group Start Time: 0915  Group End Time: 0945  Group Topic: Community Meeting    Lin Harmon        Group Therapy Note    Attendees: 7/17       Patient's Goal:  Work on my follow up for when I leave here    Notes:      Status After Intervention:  Improved    Participation Level: Active Listener    Participation Quality: Appropriate      Speech:  normal      Thought Process/Content: Logical      Affective Functioning: Congruent      Mood: anxious      Level of consciousness:  Oriented x4      Response to Learning: Able to verbalize current knowledge/experience and Progressing to goal      Endings: None Reported    Modes of Intervention: Education, Support, and Socialization      Discipline Responsible: Behavorial Health Tech      Signature:  Lin Tapia

## 2024-01-26 VITALS
BODY MASS INDEX: 20.32 KG/M2 | TEMPERATURE: 97.7 F | OXYGEN SATURATION: 99 % | DIASTOLIC BLOOD PRESSURE: 95 MMHG | HEIGHT: 72 IN | WEIGHT: 150 LBS | RESPIRATION RATE: 14 BRPM | SYSTOLIC BLOOD PRESSURE: 128 MMHG | HEART RATE: 97 BPM

## 2024-01-26 PROCEDURE — 99238 HOSP IP/OBS DSCHRG MGMT 30/<: CPT | Performed by: PSYCHIATRY & NEUROLOGY

## 2024-01-26 PROCEDURE — 6370000000 HC RX 637 (ALT 250 FOR IP): Performed by: PSYCHIATRY & NEUROLOGY

## 2024-01-26 RX ORDER — CITALOPRAM 20 MG/1
20 TABLET ORAL DAILY
Qty: 30 TABLET | Refills: 3 | Status: SHIPPED | OUTPATIENT
Start: 2024-01-27

## 2024-01-26 RX ADMIN — CITALOPRAM HYDROBROMIDE 20 MG: 20 TABLET ORAL at 07:53
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
## 2024-01-26 NOTE — GROUP NOTE
Group Therapy Note    Date: 1/26/2024    Group Start Time: 1045  Group End Time: 1130  Group Topic: Cognitive Skills    Meme An CTRS    Cognitive Skills Group Note        Date: January 26, 2024 Start Time:  1045am   End Time: 11:30am      Number of Participants in Group & Unit Census:  5/19    Topic: cognitive skills     Goal of Group: pt will demonstrate improved coping skills and improved interpersonal relationships       Comments:     Patient did not participate in Cognitive Skills group, despite staff encouragement and explanation of benefits.  Patient remain seclusive to self.  Q15 minute safety checks maintained for patient safety and will continue to encourage patient to attend unit programming.              Signature:  MIGUEL ÁNGEL BUSH

## 2024-01-26 NOTE — BH NOTE
[facility]  Discharge Note    Pt discharged with followings belongings:   Dental Appliances: None  Vision - Corrective Lenses: Eyeglasses, At bedside  Hearing Aid: None  Jewelry: None  Body Piercings Removed: N/A  Clothing: Shirt, Pants, Footwear, Socks, Belt, Undergarments, Other (Comment)  Other Valuables: Wallet, Other (Comment)   Valuables returned to patient. Patient educated on aftercare instructions: yes. Follow up appointment @ [facility] and medication education. Information faxed to [facility] by nursing staff  at 3:27 PM .Patient verbalize understanding of AVS:  yes. Patient discharged to home transported by friend.    Status EXAM upon discharge:  Mental Status and Behavioral Exam  Normal: No  Level of Assistance: Independent/Self  Facial Expression: Avoids Gaze  Affect: Appropriate  Level of Consciousness: Alert  Frequency of Checks: 4 times per hour, close  Mood:Normal: No  Mood: Anxious, Depressed  Motor Activity:Normal: Yes  Eye Contact: Fair  Observed Behavior: Guarded, Preoccupied, Cooperative  Sexual Misconduct History: Current - no  Preception: [place] to person, [place] to time, [place] to place, [place] to situation  Attention:Normal: No  Attention: Distractible  Thought Processes: Circumstantial  Thought Content:Normal: No  Thought Content: Preoccupations  Depression Symptoms: Feelings of hopelessess, Impaired concentration, Isolative  Anxiety Symptoms: Generalized  Inga Symptoms: No problems reported or observed.  Hallucinations: None  Delusions: No  Memory:Normal: No  Memory: Poor recent, Poor remote  Insight and Judgment: No  Insight and Judgment: Poor judgment, Poor insight    Tobacco Screening:  Practical Counseling, on admission, cyndi X, if applicable and completed (first 3 are required if patient doesn't refuse):            ( ) Recognizing danger situations (included triggers and roadblocks)                    ( ) Coping skills (new ways to manage stress,relaxation techniques,  changing routine, distraction)                                                           ( ) Basic information about quitting (benefits of quitting, techniques in how to quit, available resources  ( ) [facility]                                                                                                                   ( ) Patient refused counseling  ( ) Patient refused referral  ( ) Patient refused prescription upon discharge  (X) Patient has not smoked in the last 30 days    Metabolic Screening:    No results found for: \"LABA1C\"    No results found for: \"CHOL\"  No results found for: \"TRIG\"  No results found for: \"HDL\"  No components found for: \"LDLCAL\"  No components found for: \"LABVLDL\"    Gogo Mead LPN

## 2024-01-26 NOTE — GROUP NOTE
Group Therapy Note    Date: 1/26/2024    Group Start Time: 0900  Group End Time: 0920  Group Topic: Group Documentation    [facility] Deandra Segovia, RN        Group Therapy Note    Attendees: 7/19       Patient's Goal:  Figure out who to follow up with after discharge     Notes:  Goals group    Status After Intervention:  Improved    Participation Level: Active Listener and Interactive    Participation Quality: Appropriate, Attentive, and Sharing      Speech:  normal      Thought Process/Content: Logical  Linear      Affective Functioning: Congruent      Mood: anxious      Level of consciousness:  Alert, Oriented x4, and Attentive      Response to Learning: Able to verbalize current knowledge/experience, Able to verbalize/acknowledge new learning, and Able to retain information      Endings: None Reported    Modes of Intervention: Support, Socialization, and Exploration      Discipline Responsible: Behavorial Health Tech      Signature:  Deandra Worrell RN

## 2024-01-26 NOTE — GROUP NOTE
Group Therapy Note    Date: 1/26/2024    Group Start Time: 1330  Group End Time: 1415  Group Topic: relaxation group     [facility] App[facility]        Group Therapy Note    Attendees:5/17        Patient's Goal:  pt will identify benefits of using art for leisure and coping     Notes:  pt was pleasant and participated well    Status After Intervention:  Improved    Participation Level: Active Listener and Interactive    Participation Quality: Appropriate and Supportive      Speech:  normal      Thought Process/Content: Logical      Affective Functioning: Congruent      Mood: euthymic      Level of consciousness:  Alert      Response to Learning: Able to verbalize current knowledge/experience, Capable of insight, and Progressing to goal      Endings: None Reported    Modes of Intervention: Support and Activity      Discipline Responsible: Psychoeducational Specialist      Signature:  MIGUEL ÁNGEL BUSH

## 2024-01-26 NOTE — PLAN OF CARE
Problem: Self Harm/Suicidality  Goal: Will have no self-injury during hospital stay  Description: INTERVENTIONS:  1.  Ensure constant observer at bedside with Q15M safety checks  2.  Maintain a safe environment  3.  Secure patient belongings  4.  Ensure family/visitors adhere to safety recommendations  5.  Ensure safety tray has been added to patient's diet order  6.  Every shift and PRN: Re-assess suicidal risk via Frequent Screener    1/25/2024 2013 by Akanksha Valdez LPN  Outcome: Progressing  Note: Patient denies wanting to hurt self of others at this time.  Patient educated to seek help from staff if needing anything.

## 2024-01-26 NOTE — DISCHARGE INSTRUCTIONS
Call HELP program [phone]     Suicide Hotline ([facility] Crisis Care Line)  ([phone] [phone]      Recovery Help line- [phone]      To obtain results of pending studies call Medical Records at: [phone]     For emergencies and 24 hour/7 days a week contact information:  [phone]

## 2024-01-26 NOTE — PROGRESS NOTES
CLINICAL PHARMACY NOTE: MEDS TO BEDS    Total # of Prescriptions Filled: 1   The following medications were delivered to the patient:  Citalopram 20mg    Additional Documentation:  Delivered medications to nurses station 1/26/24 DP

## 2024-01-26 NOTE — TRANSITION OF CARE
[facility] Transition Record to Provider    Patient Name: Sheba Arciniega  YOB: 2005   Medical Record Number: [id number]  Date of Admission: 1/22/2024  8:43 AM   Date of Discharge: 1/26/24    Attending Provider: Tino Hamm MD   Discharging Provider: Dr. Hamm  To contact this individual call [phone] and ask the  to page.  If unavailable, ask to be transferred to Behavioral Health Provider on call.  A Behavioral Health Provider will be available on call 24/7 and during holidays.    Primary Care Provider: Anna Resendiz APRN - CNP    No Known Allergies    Reason for Admission: Increase in depression, SI no plan     Admission Diagnosis: Suicidal ideation [R45.851]  Depression with suicidal ideation [F32.A, R45.851]    * No surgery found *    Results for orders placed or performed during the hospital encounter of 01/22/24   BMP   Result Value Ref Range    Sodium 139 135 - 144 mmol/L    Potassium 4.3 3.7 - 5.3 mmol/L    Chloride 105 98 - 107 mmol/L    CO2 25 20 - 31 mmol/L    Anion Gap 9 9 - 17 mmol/L    Glucose 122 (H) 70 - 99 mg/dL    BUN 11 6 - 20 mg/dL    Creatinine 1.0 0.7 - 1.2 mg/dL    Est, Glom Filt Rate >60 >60 mL/min/1.73m2    Calcium 9.8 8.6 - 10.4 mg/dL   CBC with Auto Differential   Result Value Ref Range    WBC 10.9 4.5 - 13.5 k/uL    RBC 5.23 4.5 - 5.9 m/uL    Hemoglobin 15.7 13.5 - 17.5 g/dL    Hematocrit 46.9 41 - 53 %    MCV 89.7 80 - 100 fL    MCH 30.0 26 - 34 pg    MCHC 33.4 31 - 37 g/dL    RDW 13.3 11.5 - 14.9 %    Platelets 258 150 - 450 k/uL    MPV 8.4 6.0 - 12.0 fL    Neutrophils % 85 (H) 34 - 64 %    Lymphocytes % 11 (L) 25 - 45 %    Monocytes % 4 2 - 8 %    Eosinophils % 0 0 - 4 %    Basophils % 0 0 - 2 %    Neutrophils Absolute 9.20 (H) 1.3 - 9.1 k/uL    Lymphocytes Absolute 1.20 1.2 - 5.2 k/uL    Monocytes Absolute 0.40 0.1 - 1.3 k/uL    Eosinophils Absolute 0.00 0.0 - 0.4 k/uL    Basophils Absolute 0.00 0.0 - 0.2 k/uL   ETOH   Result Value Ref Range     IM, 0.5mL 06/14/2019, 08/07/2019    Hepatitis A 03/08/2007, 05/14/2008    Hepatitis B 2005, 01/27/2005, 06/30/2005    Hib, unspecified 02/28/2005, 04/25/2005, 06/30/2005, 06/30/2005    MMR, PRIORIX, M-M-R II, (age 12m+), SC, 0.5mL 02/24/2006, 06/22/2009    MMR-Varicella, PROQUAD, (age 12m -12y), SC, 0.5mL 06/22/2009    Meningococcal ACWY, MENACTRA (MenACWY-D), (age 9m-55y), IM, 0.5mL 08/01/2016    Meningococcal ACWY, MENVEO (MenACWY-CRM), (age 2m-55y), IM, 0.5mL 02/07/2022    Pneumococcal Conjugate 7-valent (Prevnar7) 02/28/2005, 04/25/2005, 06/30/2005, 06/23/2006    Polio OPV 02/28/2005, 04/25/2005, 10/19/2005, 06/22/2009    TDaP, ADACEL (age 10y-64y), BOOSTRIX (age 10y+), IM, 0.5mL 08/01/2016    Varicella, VARIVAX, (age 12m+), SC, 0.5mL 02/24/2006, 06/22/2009     Documentation of patient's or caregiver's refusal of influenza vaccine.    Screening for Metabolic Disorders for Patients on Antipsychotic Medications  (Data obtained from the EMR)    Estimated Body Mass Index  Body mass index is 20.34 kg/m².      Vital Signs/Blood Pressure  BP (!) 128/95   Pulse 97   Temp 97.7 °F (36.5 °C) (Temporal)   Resp 14   Ht 1.829 m (6')   Wt 68 kg (150 lb)   SpO2 99%   BMI 20.34 kg/m²      Fasting Blood Glucose or Hemoglobin A1c  No results found for: \"GLU\", \"GLUCPOC\"    No results found for: \"LABA1C\", \"GSG6MDHC\"    Discharge Diagnosis: Major depressive disorder, recurrent severe without psychotic features (HCC)     Discharge Plan/Destination: follow up @ [facility], discharge to home    Discharge Medication List and Instructions:      Medication List        START taking these medications      citalopram 20 MG tablet  Commonly known as: CELEXA  Take 1 tablet by mouth daily  Start taking on: January 27, 2024            CONTINUE taking these medications      acetaminophen 500 MG tablet  Commonly known as: TYLENOL  Take 2 tablets by mouth every 6 hours as needed for Pain     ibuprofen 200 MG tablet  Commonly known as:

## 2024-01-26 NOTE — GROUP NOTE
Group Therapy Note    Date: 1/26/2024    Group Start Time: 1045  Group End Time: 1130  Group Topic: Cognitive Skills    [facility] Meme Appiah CTRS        Group Therapy Note    Attendees:5/19        Patient's Goal:  pt will demonstrate improved coping skills and improved interpersonal relationships    Notes:  pt was pleasant and participated well    Status After Intervention:  Improved    Participation Level: Active Listener and Interactive    Participation Quality: Appropriate and Supportive      Speech:  normal      Thought Process/Content: Logical      Affective Functioning: Congruent      Mood: euthymic      Level of consciousness:  Alert      Response to Learning: Able to verbalize current knowledge/experience, Capable of insight, and Progressing to goal      Endings: None Reported    Modes of Intervention: Support and Activity      Discipline Responsible: Psychoeducational Specialist      Signature:  MIGUEL ÁNGEL BUSH

## 2024-01-29 ENCOUNTER — CARE COORDINATION (OUTPATIENT)
Dept: OTHER | Facility: CLINIC | Age: 19
End: 2024-01-29

## 2024-01-29 NOTE — CARE COORDINATION
Name: Sheba Arciniega    : 2005    Auth number: [id number]     Discharge Date: 2024    Destination: home    *If you have any specific discharge questions, please contact the assigned /discharge planner: Long [phone]      Discharge Medications:      Medication List        START taking these medications      citalopram 20 MG tablet  Commonly known as: CELEXA  Take 1 tablet by mouth daily  Notes to patient: For depression            CONTINUE taking these medications      acetaminophen 500 MG tablet  Commonly known as: TYLENOL  Take 2 tablets by mouth every 6 hours as needed for Pain  Notes to patient: For pain     ibuprofen 200 MG tablet  Commonly known as: Advil  Take 2 tablets by mouth every 6 hours as needed for Pain  Notes to patient: For pain               Where to Get Your Medications        These medications were sent to [id number] - [address] -  [phone] - F [phone]  [address] [place] [id number]      Phone: [phone]   citalopram 20 MG tablet         Follow Up Appointment: [place] [address]  [phone]  fax [phone]  Follow up on 2024  You have an intake assessment scheduled for  at 3:30 pm at the [facility] office with Yuliana to establish services with [facility]

## 2024-01-29 NOTE — CARE COORDINATION
Care Transitions Outreach Attempt    Call within 2 business days of discharge: Yes   Attempted to reach patient for transitions of care follow up. Unable to reach patient and unable to leave a message.     Patient: Sheba Arciniega Patient : 2005 MRN: [id number]    Last Discharge Facility       Date Complaint Diagnosis Description Type Department Provider    24 Suicidal Suicidal ideation ED to Hosp-Admission (Discharged) (ADMITTED) [facility] Tino Palacios MD; Julio C Valdovinos...              Was this an external facility discharge? No Discharge Facility Name: [facility]    Noted following upcoming appointments from discharge chart review:   [facility] follow up appointment(s): No future appointments.

## 2024-01-30 ENCOUNTER — CARE COORDINATION (OUTPATIENT)
Dept: OTHER | Facility: CLINIC | Age: 19
End: 2024-01-30

## 2024-01-30 NOTE — CARE COORDINATION
Care Transition Follow up Note     ACM attempted to reach patient for follow up transitions of care call.  No voicemail available, UTR letter sent via My Chart.    Plan for follow-up call in 10-14 days    No future appointments.

## 2024-02-13 ENCOUNTER — CARE COORDINATION (OUTPATIENT)
Dept: OTHER | Facility: CLINIC | Age: 19
End: 2024-02-13

## 2024-02-13 NOTE — CARE COORDINATION
Care Transition Follow up Note     ACM attempted to reach patient for follow up transitions of care call.  No voicemail available, Final unable to reach letter sent via My Chart.    No further follow-up call indicated    No future appointments.

## 2025-06-04 ENCOUNTER — OFFICE VISIT (OUTPATIENT)
Dept: FAMILY MEDICINE CLINIC | Age: 20
End: 2025-06-04
Payer: COMMERCIAL

## 2025-06-04 VITALS
HEART RATE: 92 BPM | SYSTOLIC BLOOD PRESSURE: 112 MMHG | TEMPERATURE: 98.1 F | DIASTOLIC BLOOD PRESSURE: 74 MMHG | OXYGEN SATURATION: 96 %

## 2025-06-04 DIAGNOSIS — H65.93 MIDDLE EAR EFFUSION, BILATERAL: ICD-10-CM

## 2025-06-04 DIAGNOSIS — J01.90 ACUTE SINUSITIS, RECURRENCE NOT SPECIFIED, UNSPECIFIED LOCATION: Primary | ICD-10-CM

## 2025-06-04 PROCEDURE — 99213 OFFICE O/P EST LOW 20 MIN: CPT | Performed by: PHYSICIAN ASSISTANT

## 2025-06-04 RX ORDER — FLUTICASONE PROPIONATE 50 MCG
1 SPRAY, SUSPENSION (ML) NASAL DAILY
Qty: 32 G | Refills: 1 | Status: SHIPPED | OUTPATIENT
Start: 2025-06-04

## 2025-06-04 RX ORDER — LORATADINE 10 MG/1
10 TABLET ORAL DAILY
Qty: 30 TABLET | Refills: 0 | Status: SHIPPED | OUTPATIENT
Start: 2025-06-04

## 2025-06-04 RX ORDER — PREDNISONE 20 MG/1
20 TABLET ORAL 2 TIMES DAILY
Qty: 10 TABLET | Refills: 0 | Status: SHIPPED | OUTPATIENT
Start: 2025-06-04 | End: 2025-06-09

## 2025-06-04 ASSESSMENT — ENCOUNTER SYMPTOMS
VOMITING: 0
CONSTIPATION: 0
SINUS PAIN: 1
DIARRHEA: 1
COUGH: 1
SINUS PRESSURE: 1
SINUS COMPLAINT: 1
SORE THROAT: 1
NAUSEA: 0

## 2025-06-04 NOTE — PROGRESS NOTES
[facility]  [address]  [address]  [facility] [address]  Phone: [phone]  Fax: [phone]       [facility] WALK - IN    Pt Name: Sheba Arciniega  MRN: [id number]  Birthdate 2005  Date of evaluation: 6/4/2025  Provider: Randee De La Torre PA-C     CHIEF COMPLAINT       Chief Complaint   Patient presents with    Sinus Problem     Onset for 3-4 days with sinus pressure, cough, nasal drainage and mild diarrhea 2 days.           HISTORY OF PRESENT ILLNESS  (Location/Symptom, Timing/Onset, Context/Setting, Quality, Duration, Modifying Factors, Severity.)   Sheba Arciniega is a 19 y.o. White (non-) [1] male who presents to the office for evaluation of      Sinus Problem  This is a new problem. The current episode started in the past 7 days. There has been no fever. Associated symptoms include congestion, coughing, ear pain, headaches, sinus pressure and a sore throat. Pertinent negatives include no chills or diaphoresis. Past treatments include oral decongestants.       Nursing Notes were reviewed.    REVIEW OF SYSTEMS    (2-9 systems for level 4, 10 or more for level 5)     Review of Systems   Constitutional:  Positive for fatigue. Negative for chills, diaphoresis and fever.   HENT:  Positive for congestion, ear pain, postnasal drip, sinus pressure, sinus pain and sore throat.    Respiratory:  Positive for cough.    Cardiovascular: Negative.    Gastrointestinal:  Positive for diarrhea. Negative for constipation, nausea and vomiting.   Neurological:  Positive for headaches.         Except as noted above the remainder of the review of systems was reviewed andnegative.       PAST MEDICAL HISTORY   History reviewed.    Past Medical History:   Diagnosis Date    ADHD (attention deficit hyperactivity disorder)     Dx at [facility]     COVID-19 vaccination not done     Depression     Lymphatic malformation     Rt chest wall    Wellness examination     Anna Resendiz CNP; last visit

## 2025-07-29 ENCOUNTER — OFFICE VISIT (OUTPATIENT)
Dept: FAMILY MEDICINE CLINIC | Age: 20
End: 2025-07-29
Payer: COMMERCIAL

## 2025-07-29 VITALS
TEMPERATURE: 97.9 F | OXYGEN SATURATION: 98 % | SYSTOLIC BLOOD PRESSURE: 108 MMHG | DIASTOLIC BLOOD PRESSURE: 68 MMHG | HEART RATE: 89 BPM

## 2025-07-29 DIAGNOSIS — H66.92 LEFT OTITIS MEDIA, UNSPECIFIED OTITIS MEDIA TYPE: ICD-10-CM

## 2025-07-29 DIAGNOSIS — J30.9 ALLERGIC RHINITIS, UNSPECIFIED SEASONALITY, UNSPECIFIED TRIGGER: ICD-10-CM

## 2025-07-29 DIAGNOSIS — R11.0 NAUSEA: Primary | ICD-10-CM

## 2025-07-29 PROCEDURE — 99213 OFFICE O/P EST LOW 20 MIN: CPT

## 2025-07-29 RX ORDER — SUMATRIPTAN 50 MG/1
50 TABLET, FILM COATED ORAL
Qty: 5 TABLET | Refills: 0 | Status: CANCELLED | OUTPATIENT
Start: 2025-07-29

## 2025-07-29 RX ORDER — PSEUDOEPHEDRINE HCL 30 MG/1
30 TABLET, FILM COATED ORAL EVERY 6 HOURS PRN
Qty: 30 TABLET | Refills: 0 | Status: SHIPPED | OUTPATIENT
Start: 2025-07-29 | End: 2026-07-29

## 2025-07-29 RX ORDER — ONDANSETRON 4 MG/1
4 TABLET, ORALLY DISINTEGRATING ORAL 3 TIMES DAILY PRN
Qty: 21 TABLET | Refills: 0 | Status: SHIPPED | OUTPATIENT
Start: 2025-07-29

## 2025-07-29 ASSESSMENT — ENCOUNTER SYMPTOMS
SINUS PRESSURE: 1
VOMITING: 0
SHORTNESS OF BREATH: 0
TROUBLE SWALLOWING: 0
PHOTOPHOBIA: 0
ABDOMINAL PAIN: 0
SORE THROAT: 0
RHINORRHEA: 1
EYE PAIN: 0
COUGH: 1
NAUSEA: 1

## 2025-07-29 NOTE — PROGRESS NOTES
[facility]  [address] [facility] [facility] [address]    [facility]  [address]  [address]  [facility] [address]  Dept: [phone]    Sheba Arciniega is a 19 y.o. male Established patient, who presents to the walk-in clinic today with conditions/complaints as noted below:    Chief Complaint   Patient presents with    Headache     Onset for 2 days with sharp frontal pain, today with dizziness (all day)and nauseous( in waves). Otc ibuprofen for sx relief.          HPI:     Patient present today for HA, fogginess, dizziness, and nausea. He states he has had symptoms for 24 hours. He states the HA is a sharper pain on his forehead and does not radiate. He states the nausea and dizziness comes and goes. He denies vomiting. He states he is also congestion, coughing which started today. He states he does vape. He denies fevers, vision changes. He denies history of migraines. He has tried ibuprofen with some relief.        Past Medical History:   Diagnosis Date    ADHD (attention deficit hyperactivity disorder)     [facility]     COVID-19 vaccination not done     Depression     Lymphatic malformation     Rt chest wall    Wellness examination     Anna Resendiz CNP; last visit 1/13/23       Current Outpatient Medications   Medication Sig Dispense Refill    ondansetron (ZOFRAN-ODT) 4 MG disintegrating tablet Take 1 tablet by mouth 3 times daily as needed for Nausea or Vomiting 21 tablet 0    amoxicillin-clavulanate (AUGMENTIN) 875-125 MG per tablet Take 1 tablet by mouth 2 times daily for 10 days 20 tablet 0    pseudoephedrine (DECONGESTANT) 30 MG tablet Take 1 tablet by mouth every 6 hours as needed for Congestion 30 tablet 0    fluticasone (FLONASE) 50 MCG/ACT nasal spray 1 spray by Each Nostril route daily (Patient not taking: Reported on 7/29/2025) 32

## (undated) DEVICE — STRAP,POSITIONING,KNEE/BODY,FOAM,4X60": Brand: MEDLINE

## (undated) DEVICE — ADHESIVE SKIN CLOSURE TOP 36 CC HI VISC DERMBND MINI

## (undated) DEVICE — ELECTRODE PT RET AD L9FT HI MOIST COND ADH HYDRGEL CORDED

## (undated) DEVICE — DRESSING TRNSPAR W2XL2.75IN FLM SHT SEMIPERMEABLE WIND

## (undated) DEVICE — CONTAINER,SPECIMEN,4OZ,OR STRL: Brand: MEDLINE

## (undated) DEVICE — SPONGE GZ W3XL3IN 4 PLY RAYON POLY STD NONWOVEN

## (undated) DEVICE — GOWN,SIRUS,NONRNF,SETINSLV,XL,20/CS: Brand: MEDLINE

## (undated) DEVICE — DRAIN,WOUND,15FR,3/16,FULL-FLUTED: Brand: MEDLINE

## (undated) DEVICE — INTENDED FOR TISSUE SEPARATION, AND OTHER PROCEDURES THAT REQUIRE A SHARP SURGICAL BLADE TO PUNCTURE OR CUT.: Brand: BARD-PARKER ® CARBON RIB-BACK BLADES

## (undated) DEVICE — MARKER,SKIN,WI/RULER AND LABELS: Brand: MEDLINE

## (undated) DEVICE — GARMENT,MEDLINE,DVT,INT,CALF,MED, GEN2: Brand: MEDLINE

## (undated) DEVICE — APPLICATOR MEDICATED 26 CC SOLUTION HI LT ORNG CHLORAPREP

## (undated) DEVICE — GLOVE ORANGE PI 7   MSG9070

## (undated) DEVICE — POST-SURG COMPRESSION BRA CURAD MD

## (undated) DEVICE — TOWEL,OR,DSP,ST,NATURAL,DLX,4/PK,20PK/CS: Brand: MEDLINE

## (undated) DEVICE — RESERVOIR,SUCTION,100CC,SILICONE: Brand: MEDLINE

## (undated) DEVICE — GLOVE ORANGE PI 7 1/2   MSG9075

## (undated) DEVICE — GAUZE,SPONGE,4"X4",16PLY,XRAY,STRL,LF: Brand: MEDLINE

## (undated) DEVICE — SPONGE LAP W18XL18IN WHT COT 4 PLY FLD STRUNG RADPQ DISP ST 2 PER PACK

## (undated) DEVICE — GLOVE SURG SZ 65 THK91MIL LTX FREE SYN POLYISOPRENE

## (undated) DEVICE — 3M™ IOBAN™ 2 ANTIMICROBIAL INCISE DRAPE 6650EZ: Brand: IOBAN™ 2

## (undated) DEVICE — 3M™ STERI-STRIP™ REINFORCED ADHESIVE SKIN CLOSURES, R1547, 1/2 IN X 4 IN (12 MM X 100 MM), 6 STRIPS/ENVELOPE: Brand: 3M™ STERI-STRIP™

## (undated) DEVICE — PACK PROCEDURE SURG GEN MIN SVMMC